# Patient Record
Sex: FEMALE | Race: WHITE
[De-identification: names, ages, dates, MRNs, and addresses within clinical notes are randomized per-mention and may not be internally consistent; named-entity substitution may affect disease eponyms.]

---

## 2023-09-12 ENCOUNTER — HOSPITAL ENCOUNTER (EMERGENCY)
Dept: HOSPITAL 97 - ER | Age: 23
Discharge: HOME | End: 2023-09-12
Payer: COMMERCIAL

## 2023-09-12 VITALS — SYSTOLIC BLOOD PRESSURE: 131 MMHG | DIASTOLIC BLOOD PRESSURE: 86 MMHG | TEMPERATURE: 98.4 F | OXYGEN SATURATION: 100 %

## 2023-09-12 DIAGNOSIS — Z88.1: ICD-10-CM

## 2023-09-12 DIAGNOSIS — L08.9: Primary | ICD-10-CM

## 2023-09-12 DIAGNOSIS — Z88.2: ICD-10-CM

## 2023-09-12 PROCEDURE — 99283 EMERGENCY DEPT VISIT LOW MDM: CPT

## 2023-09-12 NOTE — XMS REPORT
Continuity of Care Document

                          Created on:2023



Patient:NORMA MARQUEZ

Sex:Female

:2000

External Reference #:[id number]





Demographics







                          Address                   [address]



                                                    [place] [address]

 

                          Home Phone                [phone]

 

                          Mobile Phone              [phone]

 

                          Email Address             [email or web address]

 

                          Preferred Language        English

 

                          Marital Status            Unknown

 

                          Presybeterian Affiliation     Unknown

 

                          Race                      Unknown

 

                          Additional Race(s)        Unavailable

 

                          Ethnic Group              Unknown









Author







                          Organization              [facility]

t

 

                          Address                   [address] [facility] [address] [place] [id number]

 

                          Phone                     [phone]









Support







                Name            Relationship    Address         Phone

 

                1               Unavailable     Unavailable     Unavailable

 

                JACKIE MARQUEZ               [address] [phone]



                                                [place] [address] 









Care Team Providers







                    Name                Role                Phone

 

                    PCP, PATIENT DOES NOT HAVE A Primary Care Physician Unavaila

Sydney Reyes Attending Clinician [phone]

 

                    SYDNEY CARREON  Attending Clinician Unavailable









Payers







           Payer Name Policy Type Policy Number Effective Date Expiration Date S

ource







Problems

This patient has no known problems.



Allergies, Adverse Reactions, Alerts







       Allergy Allergy Status Severity Reaction(s) Onset  Inactive Treating Comm

ents 

Source



       Name   Type                        Date   Date   Clinician        

 

       Sulfamet Propensi Active        Hives                        Univer

s



       hoxazole ty to                                               ity of



       -Trimeth adverse                      00:00:                      [place]



       oprim  reaction                      00                          Medical



              s                                                       Branch

 

       SULFAMET DRUG   Active        Hives                        [facility]



       HOXAZOLE                                                     ity of



       -TRIMETH                             00:00:                      [place]



       OPRIM                              00                          Medical



                                                                      Branch

 

       NO KNOWN Drug   Active                                           Univers



       ALLERGIE Class                                                   ity of



       [facility]







Social History







           Social Habit Start Date Stop Date  Quantity   Comments   Source

 

           Gender identity                                             [facility]

y [facility]



                                                                  [facility] [place]

 

           Sexual orientation                                             [facility]

[facility] [place]



                                                                  [facility] [place]

 

           Sex Assigned At Birth 2000                       [facility]



                      00:00:00   00:00:00                         [facility]









                Smoking Status  Start Date      Stop Date       Source

 

                Tobacco smoking consumption                                 [facility]                                         Branch







Medications







       Ordered Filled Start  Stop   Current Ordering Indication Dosage Frequency

 Signature

                    Comments            Components          Source



     Medication Medication Date Date Medication? Clinician                (SIG) 

          



     Name Name                                                   

 

     LORazepam      2023- No             1mg       1 mg,           Univer

s



     (ATIVAN)                                Oral,           ity of



     tablet 1 mg      05:15: 05:08                          ONCE, 1           Te

[place]



               00   :00                           dose, On           Medical



                                                  23           Branch



                                                  at 0015,           



                                                  ASAP           







Vital Signs







             Vital Name   Observation Time Observation Value Comments     Source

 

             Systolic blood 2023 06:09:00 145 mm[Hg]                Univer

sity of



             pressure                                            [facility]

 

             Diastolic blood 2023 06:09:00 98 mm[Hg]                 [facility]

[facility]



             [facility]

 

             Heart rate   2023 06:09:00 73 /min                   [facility]

 

             Body temperature 2023 06:09:00 36.39 Shawna                 [facility]

 

             Respiratory rate 2023 06:09:00 18 /min                   [facility]

 

             Oxygen saturation in 2023 06:09:00 98 /min                   

[facility]



             Arterial blood by                                        [place] [facility]

tom



             Pulse oximetry                                        [place]

 

             Body height  2023 23:13:00 167.6 cm                  [facility]

 

             Body weight  2023 23:13:00 113.399 kg                [facility]

 

             BMI          2023 23:13:00 40.35 kg/m2               [facility]







Procedures







                Procedure       Date / Time     Performing Clinician Source



                                Performed                       

 

                EKG-12 LEAD     2023 04:21:26 Sydney Carreon [facility]

 

                ASSIGNMENT OF BENEFITS 2023 00:21:54 Doctor Unassigned, No

 [facility]



                                                Name            [facility]

 

                COVID-19 (ID NOW RAPID 2023 23:39:00 [facility]

[facility]



                TESTING)                                        [facility]

 

                CREATINE KINASE 2023 23:38:00 Sydney Carreon [facility]

y [facility]

 

                PREGNANCY TEST, SERUM 2023 23:38:00 Sydney Carreon [facility]

 

                COMP. METABOLIC PANEL 2023 23:38:00 Sydney Carreon

[facility]



                ([id number])                                         [facility]

 

                SALICYLATE      2023 23:38:00 Sydney Carreon [facility]

y [facility]

 

                ETHANOL         2023 23:38:00 Sydney Carreon [facility]

 

                [facility]

 

                URINE DRUG (IMMUNOASSAY) 2023 23:38:00 Sydney Carreon 

[facility]



                - COMPREHENSIVE DRUG                                 Medical Bra

nch



                SCREEN W/O REFLEX                                 

 

                CONSENT/REFUSAL FOR 2023 23:08:01 Doctor Unassigned, No Un

[facility]



                DIAGNOSIS AND TREATMENT                 Name            [facility]

 

                NOTICE OF PRIVACY 2023 23:07:42 Doctor Unassigned, No Univ

[facility]



                PRACTICES                       Name            [facility]







Encounters







        Start   End     Encounter Admission Attending Care    Care    Encounter 

Source



        Date/Time Date/Time Type    Type    Clinicians Facility Department ID   

   

 

        2023 Emergency         [facility] [[email or web address]    [id number]

[id number] [facility]



        18:20:00 02:39:00                 Sydney Orellana Florence [id number]        

 [facility] [id number]         [facility]  [id number]         Medi

tom



                                                        084             Branch

 

        2023 [facility]     [id number]

[id number] [facility]



        18:20:00 02:39:00                 [facility]







Results

This patient has no known results.



Notes







                Date/Time       Note            Provider        Source

 

                    2023          Formatting of this note might be differe

nt from the original. Inna Sanchez RN                           [facility]



                          02:36:00-00:00            Transfer warrant signed by RYLAND Allen. Pt left with [facility] 

Deputy Sewell to [facility]                           



                                Electronically signed by Inna Sanchez RN 

at 2023 2:38 AM CDT                 

 

                2023      Formatting of this note might be different from 

the original.                 [facility]



                          00:20:28-00:00            Pt seen texting on cell [employer]

e after policy has been reviewed with

 her and friend numerous times that she may not have her vape or cell phone. Pt 
would not give the phone to the RN. [facility] [[email or web address] officer aquired                        

   



                                                    phone and returned it to the

 friend who states she forgot it on the bed when 

she went to get the pt tampons. Friend and mother notified the pt needs to rest 
now and we will deliver the tampons to the pt.                           



                                Electronically signed by Inna Sanchez RN 

at 2023 12:36 AM CDT                 

 

                2023      Formatting of this note might be different from 

the original.                 [facility]



                          23:34:21-00:00            [facility]  Deputy Sewell in d

ept speaking to pt. NATACHA issued by 

Deputy Sewell                                     



                                Electronically signed by Inna Sanchez RN 

at 2023 11:36 PM CDT                 

 

                2023      Formatting of this note might be different from 

the original.                 [facility]



                          23:22:03-00:00            Pt crying and anxious, state

s "y'all should really tell people 

you are going to hold them against their will when they come here. I'm about to 
freak out. I want my shit." Pt offered PO ativan at this ti                     

      



                                me, doesn't want it at this time. [employer] arrived a

t facility at this time.                 



                                Electronically signed by Inna Sanchez RN 

at 2023 11:24 PM CDT                 

 

                2023      Formatting of this note might be different from 

the original.                 [facility]



                          22:50:25-00:00            EDP discussed POC to transfe

r to inpt psych facility. Pt 

[facility]                           



                                Electronically signed by Inna Sanchez RN 

at 2023 10:51 PM CDT                 

 

                2023      Formatting of this note might be different from 

the original.                 [facility]



                20:53:57-00:00  [facility] screener in department.              

   



                                Electronically signed by Inna Sanchez RN 

at 2023 8:54 PM CDT                 

 

                    2023          Formatting of this note might be differe

nt from the original. Aida Martin PCT                           [facility]



                          20:02:40-00:00            Luis Daniel with [facility] called s

aying he would be here within the 

hour.                                               



                                Electronically signed by Aida Martin PCT 

at 2023 8:03 PM CDT                 

 

                2023      Formatting of this note might be different from 

the original.                 [facility]



                          19:42:52-00:00            Pt medically cleared per pro

vider. POC reviewed with pt and 

provider to have [facility] evaluate her for inpt vs outpt recommendations. 
Psych screener requested from [facility].                           



                                                                



                                                    patient awake alert oriented

, calm and cooperative, resp reg unlabored, skin 

w/d, color pink, in no apparent distress, ambulatory with steady gait. Pt 
allowed to have adult female visitor.                           



                                                                



                                                    Patient/family educated on e

mergency department behavioral process and 

precautions. Educated on the need for direct observation, removal of belongings,
 and clearing of room for patient safety. Patient/f                           



                                amily given community resources for outpatient t

reatment and care.                 



                                                                



                                Electronically signed by Inna Sanchez RN 

at 2023 7:47 PM CDT                 

 

                2023      Formatting of this note is different from the or

iginal.                 [facility]



                18:50:06-00:00                                  



                                                                



                                Suicide Risk - Assessment and Plan              

   



                                                                



                                [place]:                       



                                                    1) Have you wished you were 

dead or could go to sleep and not wake up?: (P) Yes

                                                    



                                2) Have you had thoughts of killing yourself?: (

P) Yes                 



                                3) Have you been thinking about how you might ki

ll yourself?: (P) No                 



                                                                



                                4) Suicidal ideation with some intent?: (P) No  

               



                                                                



                                                    5) Have you worked out the d

etails of the plan AND intend to follow through?: 

(P) No                                              



                                                                



                                6) Suicidal Behavior or preparation for suicide?

: (P) No                 



                                                                



                                                                



                                                                



                                L[place]itte Score:                 



                                Suggested risk level: (P) Low                 



                                                                



                                SAFE-T:                         



                                Current and past psychiatric diagnoses: (P) None

                 



                                Presenting symptoms: (P) Hopelessness or despair

 (lost job recently)                 



                                Family history: (P) No family history of psychia

tric illness                 



                                                    Activating Events: (P) Recen

t losses or other significant negative event(s) 

(legal, financial, etc)                             



                                                                



                                                    Precipitants / stressors: (P

) Triggering events leading to humiliation, shame, 

and/or despair                                      



                                Protective factors: (P) Ability to cope with str

ess                 



                                                                



                                                                



                                Access to Lethal Means:                 



                                Does patient have access to a gun or access to g

uns?: (P) No                 



                                                                



                                Specific Questions of Thoughts, Plans, Intent:  

               



                                Frequency- how many times have you had these tho

ught?: (P) Many times a day                 



                                                    2.Duration - When you have t

he thoughts, how long do they last?: (P) More than 

8 hours/persistent or continuous                           



                                                    3.Controllability - could/ca

n you stop thinking about killing yourself or 

wanting to die if you want to? "Is it easy, a little hard, very hard, or are you
 unable?": (P) Unable to control thoughts                           



                                                    4.Deterrents - are there thi

ngs, anyone or anything (family, Yazidi, pain of 

death) that have stopped you from wanting to die or acting on thoughts of 
committing suicide?: (P) Deterrents definitely did not stop                     

      



                                                    Reasons for ideation - What 

are the reasons you have for wanting to die or kill

 yourself? Was it to end pain, stop the way you are feeling, or to get 
attention, or get revenge and reaction from others?:                           



                                                     (P) Completely to end or st

op the pain (you couldn't go on living with the 

pain or how you were feeling)                           



                                                                



                                Behavior Assessment:                 



                                                    1.Were there preparatory act

s like buying pills, guns, giving things away, or 

writing suicide note?: (P) Yes                           



                                2.Was an attempt aborted or self - interrupted?:

 (P) No                 



                                3.Was an attempt interrupted by someone else?: (

P) No                 



                                4.Was there an actual attempt?: (P) Yes         

        



                                5.Is there non suicidal self injury? Cutting, bi

ting, skin picking: (P) No                 



                                                                



                                                    6.For youth: parents or guar

dians should be asked about thoughts, plans, 

behaviors, mood changes, etc

: (P) Comments (na)                                



                                7.Is there homicidal ideation: if so, describe: 

(P) No                 



                                                                



                                                                



                                Stratification:                 



                                High Suicide Risk Moderate Suicide Risk Low Suic

marivel Risk                 



                                                    ?? Suicidal ideation with in

tent or intent with plan in past month (C-SSRS 

Suicidal Ideation #4 or #5)                           



                                Or                              



                                                    ?? Suicidal behavior within 

past 3 months (C-SSRS Suicidal Behavior) ?? 

Suicidal ideation with method, WITHOUT plan, intent or behavior                 

          



                                in past month (C-SSRS Suicidal Ideation #3)     

            



                                Or                              



                                                    ?? Suicidal behavior more th

an 3 months ago (C-SSRS Suicidal Behavior Lifetime)

                                                    



                                Or                              



                                                    ?? Multiple risk factors and

 few protective factors ?? Wish to die or Suicidal 

Ideation WITHOUT method, intent, plan or behavior (C-SSRS Suicidal Ideation #1 
or #2)                                              



                                Or                              



                                ?? Modifiable risk factors and strong protective

 factors                 



                                Or                              



                                ? No reported history of Suicidal Ideation or Be

havior                 



                                                                



                                Location / Risk:                 



                                Inpatient / High:                 



                                                    Suicidal ideation with inten

t and with realistic plan and no protective factors

 in past month OR suicidal behavior within the past 3 months. Includes suicidal 
behavior as reason for admission.                           



                                                                



                                                    a. Mitigate the risk for shola

cide by instituting one-one monitoring, removing 

objects that pose a risk for self-harm, assessing objects brought into a room by
 visitors, using safe transportation procedur                           



                                                    es when moving patient to an

other part of the unit or another part of the 

hospital, and performing the checklist recommendations for a safe environment.  

                         



                                                    b. Food tray in plastic or p

aper containers with plastic utensils (no knives or

 aluminum cans).                                    



                                                    c. Transfer to Inpatient Psy

chiatry facility/Psychiatry Consult once medically 

cleared.                                            



                                d. Follow-up determined by the inpatient Psych[facility]

t[facility] facility.                 



                                                                



                                                                



                                Electronically signed by Sydney Carreon FNP

 at 2023 6:50 PM CDT                 

 

                    2023          Formatting of this note might be differe

nt from the original. Una Dean RN                                [facility] [place]



                          18:28:06-00:00            Pt to remain in [facility] as paper scrubs unable to fit her.

 Pants were searched to ensure safety-no objects found, no strings.             

              



                                Electronically signed by Una Dean RN at  6:28 PM CDT                 

 

                2023      Formatting of this note might be different from 

the original.                 [facility] [place]



                          18:21:54-00:00            Patient/family educated on e

mergency department behavioral 

process and precautions. Educated on the need for direct observation, removal of
 belongings, and clearing of room for patient safety. Patient/f                 

          



                                amily given community resources for outpatient t

reatment and care.                 



                                                                



                                Electronically signed by Una Dean RN at  6:22 PM CDT                 

 

                2023      Formatting of this note might be different from 

the original.                 [facility] [place]



                          18:10:12-00:00            Pt arrived via private car w

ith mother. States "I took a bunch of

 pills the other day" (Saturday), states it was in an attempt to harm herself 
and she is still having thoughts of self harm. States she h                     

      



                                                    as never seen a MD for these

 thoughts and it was also the first time for her to

 make any attempt. States she has had "thoughts for a long time".               

            



                                Electronically signed by Bella Stephenson RN at 

2023 6:17 PM CDT

## 2023-09-12 NOTE — EDPHYS
Physician Documentation                                                                           

 [facility]                                                                 

Name: Aleisha Mckeon                                                                                

Age: 23 yrs                                                                                       

Sex: Female                                                                                       

: 2000                                                                                   

MRN: [id number]                                                                                   

Arrival Date: 2023                                                                          

Time: 11:59                                                                                       

Account#: [id number]                                                                            

Bed IW1                                                                                           

Private MD:                                                                                       

ED Physician Tremaine Tinoco                                                                         

HPI:                                                                                              

                                                                                             

12:14 This 23 yrs old Female presents to ER via Ambulatory with complaints of Stomach Lump.   kb  

12:14 the patient presents with a swollen area of the umbilical area. The patient has not     kb  

      experienced similar symptoms in the past. The patient has not recently seen a physician.    

12:15 Description: draining, erythematous, swollen. Onset: The symptoms/episode               kb  

      began/occurred 1 week(s) ago. Possible cause(s): unknown. Associated signs and              

      symptoms: Pertinent positives: drainage, erythema. Modifying factors: the symptoms are      

      alleviated by nothing, the symptoms are aggravated by touching. Severity of symptoms:       

      At their worst the symptoms were mild, moderate, in the emergency department the            

      symptoms are unchanged. Pt reports she noticed a lump in her belly button last week         

      that started draining. Now is having drainage, redness and pain.                            

                                                                                                  

OB/GYN:                                                                                           

12:12 LMP N/A - Birth control method                                                          ap3 

                                                                                                  

Historical:                                                                                       

- Allergies:                                                                                      

12:10 Sulfa (Sulfonamide Antibiotics);                                                        ap3 

12:10 Bactrim;                                                                                ap3 

- PMHx:                                                                                           

12:10 Anxiety;                                                                                ap3 

                                                                                                  

- Immunization history:: Client reports having NOT received the Covid vaccine.                    

- Social history:: Smoking status: Reported history of juuling and/or vaping.                     

                                                                                                  

                                                                                                  

ROS:                                                                                              

12:14 Constitutional: Negative for fever, chills, and weight loss.                            kb  

12:14 Skin: Positive for erythema, swelling, of the umbilical area.                               

12:14 All other systems are negative.                                                             

                                                                                                  

Exam:                                                                                             

12:14 Constitutional:  This is a well developed, well nourished patient who is awake, alert,  kb  

      and in no acute distress. Head/Face:  Normocephalic, atraumatic. ENT:  Moist Mucous         

      membranes Cardiovascular:  Regular rate  Respiratory:  Respirations even and unlabored.     

      No increased work of breathing. Talking in full sentences MS/ Extremity:  Pulses equal,     

      no cyanosis.  Neurovascular intact.  Full, normal range of motion. Neuro:  Awake and        

      alert, GCS 15, oriented to person, place, time, and situation. Moves all extremities.       

      Normal gait.                                                                                

12:14 Skin: abscess, that is small, of the umbilical area, with drainage, with induration.        

                                                                                                  

Vital Signs:                                                                                      

12:11  / 86; Pulse 83; Resp 18; Temp 98.4; Pulse Ox 100% ; Weight 122.47 kg; Height 5   ap3 

      ft. 6 in. ; Pain 10/10;                                                                     

12:11 Body Mass Index 43.58 (122.47 kg, 167.64 cm)                                            ap3 

12:11 Pain Scale: Adult                                                                       ap3 

                                                                                                  

MDM:                                                                                              

12:06 Patient medically screened.                                                             kb  

12:14 Differential diagnosis: abscess, allergic reaction, cellulitis, insect bite. Data       kb  

      reviewed: vital signs, nurses notes. Counseling: I had a detailed discussion with the       

      patient and/or guardian regarding the historical points, exam findings, and any             

      diagnostic results supporting the discharge/admit diagnosis, the need for outpatient        

      follow up, a general surgeon, to return to the emergency department if symptoms worsen      

      or persist or if there are any questions or concerns that arise at home.                    

                                                                                                  

Administered Medications:                                                                         

No medications were administered                                                                  

                                                                                                  

                                                                                                  

Disposition Summary:                                                                              

23 12:12                                                                                    

Discharge Ordered                                                                                 

      Location: Home                                                                          kb  

      Condition: Stable                                                                       kb  

      Diagnosis                                                                                   

        - Local infection of the skin and subcutaneous tissue, unspecified                    kb  

      Followup:                                                                               kb  

        - With: Emergency Department                                                               

        - When: As needed                                                                          

        - Reason: Worsening of condition                                                           

      Followup:                                                                               kb  

        - With: Private Physician                                                                  

        - When: 2 - 3 days                                                                         

        - Reason: Recheck today's complaints, Continuance of care, Re-evaluation by your           

      physician                                                                                   

      Discharge Instructions:                                                                     

        - Discharge Summary Sheet                                                             kb  

        - Skin Abscess, Easy-to-Read                                                          kb  

      Forms:                                                                                      

        - Medication Reconciliation Form                                                      kb  

        - Thank You Letter                                                                    kb  

        - Antibiotic Education                                                                kb  

        - Prescription Opioid Use                                                             kb  

        - Patient Portal Instructions                                                         kb  

        - Leadership Thank You Letter                                                         kb  

      Prescriptions:                                                                              

        - Cephalexin 500 mg Oral Capsule                                                           

            - take 1 capsule by ORAL route every 8 hours for 10 days; 30 capsule; Refills: 0, kb  

      Product Selection Permitted                                                                 

        - Clindamycin HCl 300 mg Oral Capsule                                                      

            - take 1 capsule by ORAL route every 6 hours for 10 days; 40 capsule; Refills: 0, kb  

      Product Selection Permitted                                                                 

Signatures:                                                                                       

Kerry Livingston FNP-C FNP-Ckb Prokisch, Amanda, RN                    RN   ap3                                                  

                                                                                                  

**************************************************************************************************

## 2023-09-12 NOTE — ER
Nurse's Notes                                                                                     

 [facility]                                                                 

Name: Aleisha Mckeon                                                                                

Age: 23 yrs                                                                                       

Sex: Female                                                                                       

: 2000                                                                                   

MRN: [id number]                                                                                   

Arrival Date: 2023                                                                          

Time: 11:59                                                                                       

Account#: [id number]                                                                            

Bed IW1                                                                                           

Private MD:                                                                                       

Diagnosis: Local infection of the skin and subcutaneous tissue, unspecified                       

                                                                                                  

Presentation:                                                                                     

                                                                                             

12:09 Chief complaint: Patient states: she has pain and bleeding from her belly button.       ap3 

      Coronavirus screen: At this time, the client does not indicate any symptoms associated      

      with coronavirus-19. Ebola Screen: No symptoms or risks identified at this time.            

      Initial Sepsis Screen: Does the patient meet any 2 criteria? No. Patient's initial          

      sepsis screen is negative. Does the patient have a suspected source of infection? Yes:      

      Skin breakdown/wound. Risk Assessment: Do you want to hurt yourself or someone else?        

      Patient reports no desire to harm self or others. Onset of symptoms was 2023.                                                                                       

12:09 Method Of Arrival: Ambulatory                                                           ap3 

12:09 Acuity: ARACELI 4                                                                           ap3 

                                                                                                  

Triage Assessment:                                                                                

12:11 General: Appears in no apparent distress. Behavior is anxious. Pain: Complains of pain  ap3 

      in umbilical area. Neuro: Level of Consciousness is awake, alert, obeys commands,           

      Oriented to person, place, time, situation. Cardiovascular: Patient's skin is warm and      

      dry. Respiratory: Airway is patent Respiratory effort is even, unlabored, Respiratory       

      pattern is regular, symmetrical.                                                            

                                                                                                  

OB/GYN:                                                                                           

12:12 LMP N/A - Birth control method                                                          ap3 

                                                                                                  

Historical:                                                                                       

- Allergies:                                                                                      

12:10 Sulfa (Sulfonamide Antibiotics);                                                        ap3 

12:10 Bactrim;                                                                                ap3 

- PMHx:                                                                                           

12:10 Anxiety;                                                                                ap3 

                                                                                                  

- Immunization history:: Client reports having NOT received the Covid vaccine.                    

- Social history:: Smoking status: Reported history of juuling and/or vaping.                     

                                                                                                  

                                                                                                  

Screenin:11 [facility] ED Fall Risk Assessment (Adult) History of falling in the last 3 months,       ap3 

      including since admission No falls in past 3 months (0 pts). Abuse screen: Denies           

      threats or abuse. Nutritional screening: No deficits noted. Tuberculosis screening: No      

      symptoms or risk factors identified.                                                        

                                                                                                  

Vital Signs:                                                                                      

12:11  / 86; Pulse 83; Resp 18; Temp 98.4; Pulse Ox 100% ; Weight 122.47 kg; Height 5   ap3 

      ft. 6 in. ; Pain 10/10;                                                                     

12:11 Body Mass Index 43.58 (122.47 kg, 167.64 cm)                                            ap3 

12:11 Pain Scale: Adult                                                                       ap3 

                                                                                                  

ED Course:                                                                                        

12:06 Patient arrived in ED.                                                                  mg5 

12:06 Kerry Livingston FNP-C is [facility].                                                        kb  

12:06 Tremaine Tinoco MD is Attending Physician.                                                kb  

12:10 Triage completed.                                                                       ap3 

12:12 Arm band placed on left wrist.                                                          ap3 

12:12 Patient has correct armband on for positive identification. Provided Education on:      ap3 

      wound care.                                                                                 

12:12 No provider procedures requiring assistance completed. Patient did not have IV access   ap3 

      during this emergency room visit.                                                           

                                                                                                  

Administered Medications:                                                                         

No medications were administered                                                                  

                                                                                                  

                                                                                                  

Medication:                                                                                       

12:12 VIS not applicable for this client.                                                     ap3 

                                                                                                  

Outcome:                                                                                          

12:12 Condition: good                                                                         ap3 

12:12 Discharge ordered by MD.                                                                kb  

12:15 Discharged to home ambulatory.                                                          ap3 

12:15 Discharge instructions given to patient.                                                    

12:15 Instructed on discharge instructions, follow up and referral plans. medication usage,       

      Demonstrated understanding of instructions, follow-up care, medications, Prescriptions      

      given X 2.                                                                                  

12:15 Patient left the ED.                                                                    ap3 

                                                                                                  

Signatures:                                                                                       

Kerry Livingston FNP-C FNP-Ckb Prokisch, Amanda, KIMBERLYN                    RN   ap3                                                  

Yamilex Castorena                             mg5                                                  

                                                                                                  

**************************************************************************************************